# Patient Record
(demographics unavailable — no encounter records)

---

## 2025-02-24 NOTE — HISTORY OF PRESENT ILLNESS
[Right] : right hand dominant [FreeTextEntry1] : Date injury: February 20, 2025  Patient is 4 days status post left second, third, fourth metacarpal shaft fractures which were not manipulated after an accident skiing.

## 2025-02-24 NOTE — PHYSICAL EXAM
[de-identified] : Physical exam shows the patient to be alert and oriented x3, capable of ambulation. The patient is well-developed and well-nourished in no apparent respiratory distress. Majority of the skin is intact bilaterally in the upper extremities without lymphadenopathy at the elbows.  There is no tenderness over the distal radius or carpus.  No tenderness over the CMC joints.  There is crepitus over the left second third and fourth metacarpal shafts.  No tenderness over the MP PIP or DIP joint with flexor and extensor mechanisms intact.  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  Compartments are soft with good capillary refill and sensation grossly intact [de-identified] : PA lateral and oblique shows displaced fractures of the left third fourth and fifth metacarpal shafts with angular deformity

## 2025-02-24 NOTE — ASSESSMENT
[FreeTextEntry1] : 4 days status post left second third and fourth metacarpal shaft fractures  The risks, benefits, alternatives and associated differential diagnosis was discussed with the patient. Options ranged from conservative care, therapy to surgical intervention were reviewed and all questions answered. Risks included incomplete resolution of symptoms, worsening of symptoms recurrence, tissue loss, functional loss and other risks associated with treatment of this condition Patient appeared to have an excellent understanding of the risks as well as differential diagnosis associated with this condition.  Recommend open reduction internal fixation left second third and fourth metacarpal shaft fractures (medart[employer] hand plates and screws)

## 2025-03-14 NOTE — HISTORY OF PRESENT ILLNESS
[Clean/Dry/Intact] : clean, dry and intact [Healed] : healed [Neuro Intact] : an unremarkable neurological exam [Vascular Intact] : ~T peripheral vascular exam normal [Xray (Date:___)] : [unfilled] Xray -  [Doing Well] : is doing well [No Sign of Infection] : is showing no signs of infection [Adequate Pain Control] : has adequate pain control [Sutures Removed] : sutures were removed [Steri-Strips Removed & Replaced] : steri-strips removed and replaced [Erythema] : not erythematous [Discharge] : absent of discharge [Dehiscence] : not dehisced [de-identified] : DOS: 3/4/2025 [de-identified] : 10 days s/p ORIF left second third and fourth metacarpal shaft fractures  [de-identified] : Mild swelling and ecchymosis consistent with postoperative status [de-identified] : s/p ORIF 2nd 3rd and 4th metacarpal shaft fractures, maintained alignment of hardware, in early stages of healing  [de-identified] : postop bandages removed, custom splint adjusted, hand therapy script given in office today in addition to HEP, RTO in 1 month with Dr. Reza

## 2025-04-10 NOTE — HISTORY OF PRESENT ILLNESS
[___ Weeks Post Op] : [unfilled] weeks post op [de-identified] : DOS: 3/4/25 [de-identified] : 5.5 weeks status post open reduction and internal fixation of left second, third, and fourth metacarpal shaft fractures.  Patient has started physical therapy 2-3 times a week. [de-identified] : Scars are well-healed there is no evidence of infection no sensitivity over the hardware no tenderness over the fracture sites.  No evidence of joint or tendon instability or malrotation.  Range of motion is 0/45 of the index MP 10/40 of the third digit passively correctable to 0 degrees 0/40 of the fourth and 0/80 of the fifth MP joint. The 2nd through 5th PIP joints 0/110 and DIP joint 0/80  strength is 85 pounds on the right and 20 pounds on the left Right-hand-dominant There is good capillary refill of the digits bilaterally.There is no masses or sensitivity over the median and ulnar nerves at the level of the wrist. There is a negative Tinel's and negative Phalen's sign bilaterally. The sensation is grossly intact bilaterally. [de-identified] : PA lateral and obliques of the left hand shows hardware in place with callus formation at the fracture sites suggesting radiographic evidence of union [de-identified] : 5.5 weeks status post open reduction and internal fixation of left second, third, and fourth metacarpal shaft fractures [de-identified] : 5 and half weeks status post left second third and fourth metacarpals ORIF with clinical and radiographic evidence of union.  Stiffness of the 2nd through 4th digits.  Patient does still have residual stiffness but minimal swelling.  Patient will continue aggressive therapy and home program.  Return to the office in 5 weeks